# Patient Record
Sex: FEMALE | Race: BLACK OR AFRICAN AMERICAN | Employment: UNEMPLOYED | URBAN - METROPOLITAN AREA
[De-identification: names, ages, dates, MRNs, and addresses within clinical notes are randomized per-mention and may not be internally consistent; named-entity substitution may affect disease eponyms.]

---

## 2019-02-20 ENCOUNTER — OFFICE VISIT (OUTPATIENT)
Dept: FAMILY MEDICINE CLINIC | Age: 29
End: 2019-02-20

## 2019-02-20 ENCOUNTER — HOSPITAL ENCOUNTER (OUTPATIENT)
Dept: LAB | Age: 29
Discharge: HOME | End: 2019-02-20
Payer: MEDICAID

## 2019-02-20 ENCOUNTER — HOSPITAL ENCOUNTER (OUTPATIENT)
Dept: GENERAL RADIOLOGY | Age: 29
Discharge: HOME | End: 2019-02-20
Payer: MEDICAID

## 2019-02-20 VITALS
HEART RATE: 79 BPM | WEIGHT: 206 LBS | BODY MASS INDEX: 35.17 KG/M2 | RESPIRATION RATE: 16 BRPM | OXYGEN SATURATION: 97 % | SYSTOLIC BLOOD PRESSURE: 115 MMHG | HEIGHT: 64 IN | TEMPERATURE: 98.1 F | DIASTOLIC BLOOD PRESSURE: 80 MMHG

## 2019-02-20 DIAGNOSIS — G89.29 CHRONIC MIDLINE LOW BACK PAIN WITHOUT SCIATICA: ICD-10-CM

## 2019-02-20 DIAGNOSIS — E28.2 PCOS (POLYCYSTIC OVARIAN SYNDROME): ICD-10-CM

## 2019-02-20 DIAGNOSIS — M79.672 LEFT FOOT PAIN: ICD-10-CM

## 2019-02-20 DIAGNOSIS — N92.6 IRREGULAR MENSES: Primary | ICD-10-CM

## 2019-02-20 DIAGNOSIS — F32.1 MODERATE MAJOR DEPRESSION (HCC): ICD-10-CM

## 2019-02-20 DIAGNOSIS — E66.01 SEVERE OBESITY (HCC): ICD-10-CM

## 2019-02-20 DIAGNOSIS — F51.04 PSYCHOPHYSIOLOGIC INSOMNIA: ICD-10-CM

## 2019-02-20 DIAGNOSIS — M54.50 CHRONIC MIDLINE LOW BACK PAIN WITHOUT SCIATICA: ICD-10-CM

## 2019-02-20 DIAGNOSIS — R63.2 BINGE EATING: ICD-10-CM

## 2019-02-20 DIAGNOSIS — N92.6 IRREGULAR MENSES: ICD-10-CM

## 2019-02-20 LAB
CHOLEST SERPL-MCNC: 264 MG/DL
EST. AVERAGE GLUCOSE BLD GHB EST-MCNC: 126 MG/DL
HBA1C MFR BLD: 6 % (ref 4.2–5.6)
HCG URINE, QL. (POC): NEGATIVE
HDLC SERPL-MCNC: 55 MG/DL (ref 40–60)
HDLC SERPL: 4.8 {RATIO} (ref 0–5)
LDLC SERPL CALC-MCNC: 160.2 MG/DL (ref 0–100)
LIPID PROFILE,FLP: ABNORMAL
TRIGL SERPL-MCNC: 244 MG/DL (ref ?–150)
VALID INTERNAL CONTROL?: YES
VLDLC SERPL CALC-MCNC: 48.8 MG/DL

## 2019-02-20 PROCEDURE — 84403 ASSAY OF TOTAL TESTOSTERONE: CPT

## 2019-02-20 PROCEDURE — 36415 COLL VENOUS BLD VENIPUNCTURE: CPT

## 2019-02-20 PROCEDURE — 80061 LIPID PANEL: CPT

## 2019-02-20 PROCEDURE — 83036 HEMOGLOBIN GLYCOSYLATED A1C: CPT

## 2019-02-20 PROCEDURE — 73630 X-RAY EXAM OF FOOT: CPT

## 2019-02-20 RX ORDER — METFORMIN HYDROCHLORIDE 500 MG/1
500 TABLET, EXTENDED RELEASE ORAL
Qty: 30 TAB | Refills: 0 | Status: SHIPPED | OUTPATIENT
Start: 2019-02-20

## 2019-02-20 RX ORDER — BUPROPION HYDROCHLORIDE 150 MG/1
150 TABLET, EXTENDED RELEASE ORAL 2 TIMES DAILY
Qty: 60 TAB | Refills: 0 | Status: SHIPPED | OUTPATIENT
Start: 2019-02-20

## 2019-02-20 NOTE — PROGRESS NOTES
Chief Complaint Patient presents with  Foot Pain  Back Pain  Shortness of Breath  
  while lying down  Abdominal Cramping  Irregular Menses 1. Have you been to the ER, urgent care clinic since your last visit? Hospitalized since your last visit? No 
 
2. Have you seen or consulted any other health care providers outside of the [address] [place] [address] since your last visit? Include any pap smears or colon screening. No 
 
 
3 most recent PHQ Screens 2/20/2019 Little interest or pleasure in doing things More than half the days Feeling down, depressed, irritable, or hopeless More than half the days Total Score PHQ 2 4

## 2019-02-20 NOTE — PROGRESS NOTES
Tenisha Kam is a 34 y.o.  female and presents with Chief Complaint Patient presents with  Foot Pain  Back Pain  Shortness of Breath  
  while lying down  Abdominal Cramping  Irregular Menses Subjective: 
Tenisha Kam presents to office to establish care since getting insurance and address insomnia, L foot pain/cramps and lower back pain and irregular menses. Pt has no acute complaints today. 
  
  
Insomnia: 
Pt says that she is unable to sleep well. She admits to a bad sleep schedule, sleeps at 8 and then wakes up around 2 is up until 5 and then sleeps til 7. P watches TV before bed in bed, plays games in bed and mostly spends time in bed when home. Snores loudly, doesn't know if apnea 
  
Left Foot pain:  
2 months, front of foot, walked outside wihtout shoes and got a charley horse, cramps when walks, gets  aburning sensation. Only there. Tried stretching. every day, getting worse, never before. Pain is a shapr/pressure pain, pressing on it makes it better. No medications. 7/10 pain. No radiating. No change with time of day, wrose with cold. 
  
Low back pain: 
Stands for 30 mins and then gets a sore, burning feeling. Started 2 months ago. Starts lower back and moves up. Has stried strarightenin posture and doesn't help. Diffuse by sitting. Feels crampy, 9/10 at worst, severe pain bringing ot tears. Tried no meds. Warm towel has not helped. 
  
Shortness of breath with excersion: No hx of asthma or eczema or allergic rhinitis 
  
Irregular menses: LMP: 4 months ago. Was on [facility] in [id number]. Had the depo shot. Only got once. Has had irregular menses since menarche. Never checked on why. No hirtisim. No deepening of voice. Gets admonial cramps - got a cramps post sneezing. Comes and goes. Has had \"flutters\" since birth of son. 
  
 
 
Patient Active Problem List  
Diagnosis Code  Severe obesity (HCC) E66.01 Patient Active Problem List  
 Diagnosis Date Noted  Severe obesity ([facility] [address] 02/20/2019 Current Outpatient Medications Medication Sig Dispense Refill  naproxen (NAPROSYN) 500 mg tablet Take 1 Tab by mouth every twelve (12) hours as needed for Pain. 10 Tab 0 No Known Allergies Past Medical History:  
Diagnosis Date  Obesity (BMI 30-39. 9) History reviewed. No pertinent surgical history. Family History Family history unknown: Yes  
 
Social History Tobacco Use  Smoking status: Never Smoker  Smokeless tobacco: Never Used Substance Use Topics  Alcohol use: Yes Comment: socially ROS General ROS: negative for - chills or fever Psychological ROS: positive for - depression; no SI Ophthalmic ROS: negative for - blurry vision ENT ROS: negative for - headaches Endocrine ROS: negative for - polydipsia/polyuria or temperature intolerance Cardiovascular ROS: no chest pain Gastrointestinal ROS: no abdominal pain, change in bowel habits, or black or bloody stools Genito-Urinary ROS: no dysuria, trouble voiding, or hematuria Musculoskeletal ROS: positive for - pain in back - lower, upper Neurological ROS: negative for - numbness/tingling Dermatological ROS: negative for - rash or skin lesion changes All other systems reviewed and are negative. Objective: 
Vitals:  
 02/20/19 0913 BP: 115/80 Pulse: 79 Resp: 16 Temp: 98.1 °F (36.7 °C) TempSrc: Oral  
SpO2: 97% Weight: 206 lb (93.4 kg) Height: 5' 4\" (1.626 m) PainSc:   7 PainLoc: Foot LMP: 10/03/2018 Gen: well developed, well nourished adult female in no acute distress, A&Ox3 HEENT: normocephalic, atraumatic head. Eyes: PERRLA. Ears: canals patent, non erythematous, good light reflex on TM. Nose: nares patent, no erythema or exudates. Oral: mucosa nonerythematous, tonsils 1+, no exudates. Lymph nodes: nontender on palpation, no swelling. CV: RRR S1 and S2 normal. No murmurs, rubs, clicks or gallops Resp: CTA Abd: flat abdomen. No scars, ecchymosis. nontender on light and deep palpation. Negative beyer's sign. No hepatosplenomegaly. No aortic or renal bruits. Extrem:  No swelling or tenderness. Radial, dorsalis pedis and posterior tibials pulses 2+. Skin: no jaundice or erythema. Neuro: CN 2-12 intact MSK: muscle strength 5/5 in UE and LE bilaterally. DTRs 2+ in C4,5,6 and L4, S1 bilaterally. Equal sensation bilaterally. Pain on palpation of lower back paraspinal and spinal L3-L5. Left foot has high arch. No pain on palpation. Pt has trouble with abduction of toes. Pain is relieved with pressure on MP joint. Assessment/Plan: 1. Irregular menses Assess for etiology; likely due to PCOS with clinical findings - AMB POC URINE PREGNANCY TEST, VISUAL COLOR COMPARISON 
- TESTOSTERONE, FREE & TOTAL; Future 2. Moderate major depression ([address] Encourage active lifestyle; start antidepressant 
- buPROPion SR (WELLBUTRIN SR) 150 mg SR tablet; Take 1 Tab by mouth two (2) times a day. Dispense: 60 Tab; Refill: 0 
 
3. Severe obesity ([address] Screen for diabetes 
- HEMOGLOBIN A1C WITH EAG; Future - LIPID PANEL; Future 4. Left foot pain Imaging ordered; no ttp 
- XR FOOT LT MIN 3 V; Future 5. Chronic midline low back pain without sciatica Pt with macromastia which can contribute to pain as well as obesity; encourage core exercises 6. Binge eating Bupropion started 7. Psychophysiologic insomnia Discussed sleep hygiene 8. PCOS (polycystic ovarian syndrome) Start metformin 
- metFORMIN ER (GLUCOPHAGE XR) 500 mg tablet; Take 1 Tab by mouth daily (with dinner). Dispense: 30 Tab; Refill: 0 Lab review: orders written for new lab studies as appropriate; see orders I have discussed the diagnosis with the patient and the intended plan as seen in the above orders. The patient has received an after-visit summary and questions were answered concerning future plans.   I have discussed medication side effects and warnings with the patient as well. I have reviewed the plan of care with the patient, accepted their input and they are in agreement with the treatment goals. Follow-up Disposition: 
Return in about 1 month (around 3/20/2019) for medication evaluation.

## 2019-02-20 NOTE — PROGRESS NOTES
Student Progress Note Please refer to attendings daily rounding note for full details Patient: Eusebia Perry MRN: [id number]  CSN: [id number] YOB: 1990  Age: 34 y.o. Sex: female DOA: (Not on file) LOS:  LOS: 0 days Chief Complaint:  Establish care, L foot pain/cramp and low back pain and irregular quiroz Subjective:  
 Eusebia Perry is a 34 y.o. female with a no hx is here to establish care since getting insurance and address insomnia, L foot pain/cramps and lower back pain and irregular menses. Pt has no acute complaints today. Insomnia: 
Pt says that she is unable to sleep well. She admits to a bad sleep schedule, sleeps at 8 and then wakes up around 2 is up until 5 and then sleeps til 7. P watches TV before bed in bed, plays games in bed and mostly spends time in bed when home. Snores loudly, doesn't know if apnea Left Foot pain:  
2 months, front of foot, walked outside wihtout shoes and got a charley horse, cramps when walks, gets  aburning sensation. Only there. Tried stretching. every day, getting worse, never before. Pain is a shapr/pressure pain, pressing on it makes it better. No medications. 7/10 pain. No radiating. No change with time of day, wrose with cold. Low back pain: 
Stands for 30 mins and then gets a sore, burning feeling. Started 2 months ago. Starts lower back and moves up. Has stried strarightenin posture and doesn't help. Diffuse by sitting. Feels crampy, 9/10 at worst, severe pain bringing ot tears. Tried no meds. Warm towel has not helped. Shortness of breath with excersion: No hx of asthma or eczema or allergic rhinitis Irregular menses: LMP: 4 months ago. Was on [facility] in [id number]. Had the depo shot. Only got once. Has had irregular menses since menarche. Never checked on why. No hirtisim. No deepening of voice. Gets admonial cramps - got a cramps post sneezing. Comes and goes. Has had \"flutters\" since birth of son.  
 
 
PMH: 
 denies Meds: 
denies Allergies: 
denies PSH: 
denies FH: Father: alive - ? 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 Diet: poor diet, fast food, large portion sizes, lots of carbs and binge eating, wants to change, but hard to. Doesn't drink soda or water. But drinks mostly juices. Interested in [employer] Exercise: tries to walk daily ROS: denies HA, dizziness, night sweats, ear pain, eye pain, dry eyes, dry mouth, polydipsia, difficulty swallowing, cough, chest pain, SOB, abd pain, n/v/d, change in urination frequency, hematuria, constipation, melena, hematochezia. positive polydpsia. Objective:  
  
Visit Vitals /80 (BP 1 Location: Right arm, BP Patient Position: Sitting) Pulse 79 Temp 98.1 °F (36.7 °C) (Oral) Resp 16 Ht 5' 4\" (1.626 m) Wt 206 lb (93.4 kg) SpO2 97% BMI 35.36 kg/m² Intake and Output: 
Current Shift: No intake/output data recorded. Last three shifts: No intake/output data recorded. Physical Exam: 
Gen: well developed, well nourished adult female in no acute distress, A&Ox3 HEENT: normocephalic, atraumatic head. Eyes: PERRLA. Ears: canals patent, non erythematous, good light reflex on TM. Nose: nares patent, no erythema or exudates. Oral: mucosa nonerythematous, tonsils 1+, no exudates. Lymph nodes: nontender on palpation, no swelling. CV: RRR S1 and S2 normal. No murmurs, rubs, clicks or gallops Resp: CTA Abd: flat abdomen. No scars, ecchymosis. nontender on light and deep palpation. Negative beyer's sign. No hepatosplenomegaly. No aortic or renal bruits. Extrem:  No swelling or tenderness. Radial, dorsalis pedis and posterior tibials pulses 2+. Skin: no jaundice or erythema. Neuro: CN 2-12 intact MSK: muscle strength 5/5 in UE and LE bilaterally. DTRs 2+ in C4,5,6 and L4, S1 bilaterally. Equal sensation bilaterally. Pain on palpation of lower back paraspinal and spinal L3-L5. Left foot has high arch. No pain on palpation. Pt has trouble with abduction of toes. Pain is relieved with pressure on MP joint. All Micro Results None No results found for this or any previous visit (from the past 24 hour(s)). Current Outpatient Medications Medication Sig Dispense Refill  naproxen (NAPROSYN) 500 mg tablet Take 1 Tab by mouth every twelve (12) hours as needed for Pain. 10 Tab 0 I have reviewed the patient's Labs and Radiology studies. Assessment/Plan:  
 
Insomnia - possible ADRIAN, order sleep study Depression - moderate major depression. prescribe wellbutrin Irregular menses - Possible PCOS, order transvaginal US and total testosterone. referral to OBGYN. Left foot pain - ddx: plantar fascitis? Xray of left foot Low back pain - core strength exercises, ibuprofen for pain and inflammation. Weight loss encouraged. Reassess in 1 month. Consider xray and PT at that time. Weight loss/obestiy - wellbutrin, encourage healthier choices and excersise 
 
heatlh maintenance - pap smear, HbA1c, lipid panel, CBC, CMP Rustam  2/20/2019 
9:26 AM 
 
*ATTENTION:  This note has been created by a medical student for educational purposes only. Please do not refer to the content of this note for clinical decision-making, billing, or other purposes. Please see attending physicians note to obtain clinical information on this patient. *

## 2019-02-20 NOTE — PATIENT INSTRUCTIONS
Polycystic Ovary Syndrome: Care Instructions Your Care Instructions Polycystic ovary syndrome, or PCOS, means a woman's hormones are out of balance. It can cause problems with your periods and make it hard to get pregnant. Doctors don't know for sure what causes PCOS, but it seems to run in families. It also seems to be linked to obesity and a risk for diabetes. If you have PCOS, your sisters and daughters have a higher chance of getting it too. You may have other symptoms. These include weight gain, acne, too much hair growth on the face or body, high blood pressure, and high blood sugar. Your ovaries may have cysts on them. These cysts are growths filled with fluid. Keep in mind that although you may not have regular periods, you can still get pregnant. Talk to your doctor about birth control if you do not want to get pregnant. Sometimes the hormone changes with PCOS can also make it hard for some women to get pregnant. If this is a concern, talk to your doctor about treatment for this problem. Women who have PCOS can go for months or longer with no period. Your doctor may recommend medicines that can help get your cycles back to normal. 
Follow-up care is a key part of your treatment and safety. Be sure to make and go to all appointments, and call your doctor if you are having problems. It's also a good idea to know your test results and keep a list of the medicines you take. How can you care for yourself at home? · Take your medicines exactly as prescribed. Call your doctor if you think you are having a problem with your medicine. · Eat a healthy diet. Include fruits, vegetables, beans, and whole grains in your diet each day. · If you are overweight, losing weight can help with many of the symptoms of PCOS. Talk to your doctor about safe ways to lose weight. · Get at least 30 minutes of exercise on most days of the week.  Walking is a good choice. Or you can run, swim, cycle, or play tennis or team sports. · For hair growth you don't want, try bleaching, plucking, electrolysis, or laser therapy. · Acne can be treated with over-the-counter medicines. Look for ones that have benzoyl peroxide or salicylic acid in them. When should you call for help? Call your doctor now or seek immediate medical care if: 
  · You have severe vaginal bleeding.  
  · You have new or worse belly or pelvic pain.  
 Watch closely for changes in your health, and be sure to contact your doctor if: 
  · You do not get better as expected.  
  · You have unusual vaginal bleeding. Where can you learn more? Go to http://philip-juan.info/. Enter T747 in the search box to learn more about \"Polycystic Ovary Syndrome: Care Instructions. \" Current as of: May 14, 2018 Content Version: 11.9 © 5422-3720 [employer]. Care instructions adapted under license by [employer] (which disclaims liability or warranty for this information). If you have questions about a medical condition or this instruction, always ask your healthcare professional. [place] [address] any warranty or liability for your use of this information. Insomnia: Care Instructions Your Care Instructions Insomnia is the inability to sleep well. It is a common problem for most people at some time. Insomnia may make it hard for you to get to sleep, stay asleep, or sleep as long as you need to. This can make you tired and grouchy during the day. It can also make you forgetful, less effective at work, and unhappy. Insomnia can be caused by conditions such as depression or anxiety. Pain can also affect your ability to sleep. When these problems are solved, the insomnia usually clears up. But sometimes bad sleep habits can cause insomnia. If insomnia is affecting your work or your enjoyment of life, you can take steps to improve your sleep. Follow-up care is a key part of your treatment and safety. Be sure to make and go to all appointments, and call your doctor if you are having problems. It's also a good idea to know your test results and keep a list of the medicines you take. How can you care for yourself at home? What to avoid · Do not have drinks with caffeine, such as coffee or black tea, for 8 hours before bed. · Do not smoke or use other types of tobacco near bedtime. Nicotine is a stimulant and can keep you awake. · Avoid drinking alcohol late in the evening, because it can cause you to wake in the middle of the night. · Do not eat a big meal close to bedtime. If you are hungry, eat a light snack. · Do not drink a lot of water close to bedtime, because the need to urinate may wake you up during the night. · Do not read or watch TV in bed. Use the bed only for sleeping and sexual activity. What to try · Go to bed at the same time every night, and wake up at the same time every morning. Do not take naps during the day. · Keep your bedroom quiet, dark, and cool. · Sleep on a comfortable pillow and mattress. · If watching the clock makes you anxious, turn it facing away from you so you cannot see the time. · If you worry when you lie down, start a worry book. Well before bedtime, write down your worries, and then set the book and your concerns aside. · Try meditation or other relaxation techniques before you go to bed. · If you cannot fall asleep, get up and go to another room until you feel sleepy. Do something relaxing. Repeat your bedtime routine before you go to bed again. · Make your house quiet and calm about an hour before bedtime. Turn down the lights, turn off the TV, log off the computer, and turn down the volume on music. This can help you relax after a busy day. When should you call for help? Watch closely for changes in your health, and be sure to contact your doctor if:   · Your efforts to improve your sleep do not work.  
  · Your insomnia gets worse.  
  · You have been feeling down, depressed, or hopeless or have lost interest in things that you usually enjoy. Where can you learn more? Go to http://philip-juan.info/. Enter P513 in the search box to learn more about \"Insomnia: Care Instructions. \" Current as of: June 28, 2018 Content Version: 11.9 © 4285-5424 [employer]. Care instructions adapted under license by [employer] (which disclaims liability or warranty for this information). If you have questions about a medical condition or this instruction, always ask your healthcare professional. [address] any warranty or liability for your use of this information. Low Back Pain: Exercises Your Care Instructions Here are some examples of typical rehabilitation exercises for your condition. Start each exercise slowly. Ease off the exercise if you start to have pain. Your doctor or physical therapist will tell you when you can start these exercises and which ones will work best for you. How to do the exercises Press-up 1. Lie on your stomach, supporting your body with your forearms. 2. Press your elbows down into the floor to raise your upper back. As you do this, relax your stomach muscles and allow your back to arch without using your back muscles. As your press up, do not let your hips or pelvis come off the floor. 3. Hold for 15 to 30 seconds, then relax. 4. Repeat 2 to 4 times. Alternate arm and leg (bird dog) exercise 1. Start on the floor, on your hands and knees. 2. Tighten your belly muscles. 3. Raise one leg off the floor, and hold it straight out behind you. Be careful not to let your hip drop down, because that will twist your trunk. 4. Hold for about 6 seconds, then lower your leg and switch to the other leg. 5. Repeat 8 to 12 times on each leg. 6. Over time, work up to holding for 10 to 30 seconds each time. 7. If you feel stable and secure with your leg raised, try raising the opposite arm straight out in front of you at the same time. Knee-to-chest exercise 1. Lie on your back with your knees bent and your feet flat on the floor. 2. Bring one knee to your chest, keeping the other foot flat on the floor (or keeping the other leg straight, whichever feels better on your lower back). 3. Keep your lower back pressed to the floor. Hold for at least 15 to 30 seconds. 4. Relax, and lower the knee to the starting position. 5. Repeat with the other leg. Repeat 2 to 4 times with each leg. 6. To get more stretch, put your other leg flat on the floor while pulling your knee to your chest. 
 
Curl-ups 1. Lie on the floor on your back with your knees bent at a 90-degree angle. Your feet should be flat on the floor, about 12 inches from your buttocks. 2. Cross your arms over your chest. If this bothers your neck, try putting your hands behind your neck (not your head), with your elbows spread apart. 3. Slowly tighten your belly muscles and raise your shoulder blades off the floor. 4. Keep your head in line with your body, and do not press your chin to your chest. 
5. Hold this position for 1 or 2 seconds, then slowly lower yourself back down to the floor. 6. Repeat 8 to 12 times. Pelvic tilt exercise 1. Lie on your back with your knees bent. 2. \"Brace\" your stomach. This means to tighten your muscles by pulling in and imagining your belly button moving toward your spine. You should feel like your back is pressing to the floor and your hips and pelvis are rocking back. 3. Hold for about 6 seconds while you breathe smoothly. 4. Repeat 8 to 12 times. Heel dig bridging 1. Lie on your back with both knees bent and your ankles bent so that only your heels are digging into the floor. Your knees should be bent about 90 degrees. 2. Then push your heels into the floor, squeeze your buttocks, and lift your hips off the floor until your shoulders, hips, and knees are all in a straight line. 3. Hold for about 6 seconds as you continue to breathe normally, and then slowly lower your hips back down to the floor and rest for up to 10 seconds. 4. Do 8 to 12 repetitions. Hamstring stretch in doorway 1. Lie on your back in a doorway, with one leg through the open door. 2. Slide your leg up the wall to straighten your knee. You should feel a gentle stretch down the back of your leg. 3. Hold the stretch for at least 15 to 30 seconds. Do not arch your back, point your toes, or bend either knee. Keep one heel touching the floor and the other heel touching the wall. 4. Repeat with your other leg. 5. Do 2 to 4 times for each leg. Hip flexor stretch 1. Kneel on the floor with one knee bent and one leg behind you. Place your forward knee over your foot. Keep your other knee touching the floor. 2. Slowly push your hips forward until you feel a stretch in the upper thigh of your rear leg. 3. Hold the stretch for at least 15 to 30 seconds. Repeat with your other leg. 4. Do 2 to 4 times on each side. Wall sit 1. Stand with your back 10 to 12 inches away from a wall. 2. Lean into the wall until your back is flat against it. 3. Slowly slide down until your knees are slightly bent, pressing your lower back into the wall. 4. Hold for about 6 seconds, then slide back up the wall. 5. Repeat 8 to 12 times. Follow-up care is a key part of your treatment and safety. Be sure to make and go to all appointments, and call your doctor if you are having problems. It's also a good idea to know your test results and keep a list of the medicines you take. Where can you learn more? Go to http://philip-juan.info/. Enter U592 in the search box to learn more about \"Low Back Pain: Exercises. \" Current as of: September 20, 2018 Content Version: 11.9 © 1091-5418 [employer]. Care instructions adapted under license by [employer] (which disclaims liability or warranty for this information). If you have questions about a medical condition or this instruction, always ask your healthcare professional. [address] any warranty or liability for your use of this information.

## 2019-02-21 LAB
TESTOST FREE SERPL-MCNC: 5.7 PG/ML (ref 0–4.2)
TESTOST SERPL-MCNC: 70 NG/DL (ref 8–48)

## 2019-03-21 ENCOUNTER — DOCUMENTATION ONLY (OUTPATIENT)
Dept: FAMILY MEDICINE CLINIC | Age: 29
End: 2019-03-21

## 2019-03-21 NOTE — PROGRESS NOTES
Patient did not arrive to their scheduled appointment on 3/20/19. No show letter #1 sent on 03/21/19. Thank you.